# Patient Record
(demographics unavailable — no encounter records)

---

## 2024-12-27 NOTE — HISTORY OF PRESENT ILLNESS
[FreeTextEntry8] : 26-year-old female for UTI symptoms since Jeff.  Noticing blood and incontinence.

## 2024-12-27 NOTE — PLAN
[FreeTextEntry1] : 26-year-old female for UTI symptoms.  Urine dip.  Culture sent.  Empiric antibiotic therapy discussed. Adequate hydration advised.  Signs and symptoms warranting further eval advised.  All questions answered.  Patient voiced understanding and in agreement to plan.  Return to clinic as recommended.

## 2024-12-27 NOTE — REVIEW OF SYSTEMS
[Fever] : no fever [Fatigue] : no fatigue [Discharge] : no discharge [Earache] : no earache [Sore Throat] : no sore throat [Chest Pain] : no chest pain [Palpitations] : no palpitations [Shortness Of Breath] : no shortness of breath [Cough] : no cough [Abdominal Pain] : no abdominal pain [Constipation] : no constipation [Diarrhea] : diarrhea [Headache] : no headache

## 2025-01-28 NOTE — HISTORY OF PRESENT ILLNESS
[Home] : at home, [unfilled] , at the time of the visit. [Medical Office: ([facility])___] : at the medical office located in  [Verbal consent obtained from patient] : the patient, [unfilled] [FreeTextEntry1] : pain  [de-identified] : 26-year-old female for f/u.  Notes falling down concrete stairs on Sunday. Reporting tailbone pain.  Using ice.

## 2025-01-28 NOTE — PLAN
[FreeTextEntry1] : 26-year-old female for coccydynia.  X-ray and supportive therapy discussed.  Signs and symptoms warranting further eval advised.  All questions answered.  Patient voiced understanding and in agreement to plan.  Return to clinic as recommended.

## 2025-07-24 NOTE — HISTORY OF PRESENT ILLNESS
[TextBox_4] : 12/30/7, not too heavy or painful SA, not using condoms or anything else for BC. Does not want to get pregnant. Used to be on ocp but got melasma. States will use condoms Felt right breast lump in May, sent for sono and it showed .8cm nodule 6 month fu recommended. [PapSmeardate] : 2024 [No] : Patient does not have concerns regarding sex [Currently Active] : currently active [Men] : men [Vaginal] : vaginal

## 2025-07-29 NOTE — HEALTH RISK ASSESSMENT
[Yes] : Yes [Monthly or less (1 pt)] : Monthly or less (1 point) [1 or 2 (0 pts)] : 1 or 2 (0 points) [Never (0 pts)] : Never (0 points) [0] : 2) Feeling down, depressed, or hopeless: Not at all (0) [Current] : Current [Patient reported PAP Smear was normal] : Patient reported PAP Smear was normal [Audit-CScore] : 1 [de-identified] : DASH [EyeExamDate] : 06/2025 [PapSmearDate] : 07/2025

## 2025-07-29 NOTE — PLAN
[FreeTextEntry1] : 27-year-old female for CPE. Labs as ordered. PAP per GYN.  All questions answered.  Patient voiced understanding and in agreement to plan.  Return to clinic as recommended.